# Patient Record
Sex: FEMALE | Race: WHITE | NOT HISPANIC OR LATINO | Employment: STUDENT | URBAN - METROPOLITAN AREA
[De-identification: names, ages, dates, MRNs, and addresses within clinical notes are randomized per-mention and may not be internally consistent; named-entity substitution may affect disease eponyms.]

---

## 2023-06-22 PROBLEM — H10.30 ACUTE CONJUNCTIVITIS: Status: ACTIVE | Noted: 2023-06-22

## 2023-06-22 PROBLEM — Q82.5 CONGENITAL NEVUS: Status: ACTIVE | Noted: 2023-06-22

## 2023-06-22 PROBLEM — H52.31 ANISOMETROPIA: Status: ACTIVE | Noted: 2023-06-22

## 2023-06-22 PROBLEM — M25.571 ANKLE PAIN, RIGHT: Status: ACTIVE | Noted: 2023-06-22

## 2023-06-22 PROBLEM — H10.012: Status: ACTIVE | Noted: 2023-06-22

## 2023-06-22 PROBLEM — G47.9 SLEEP DIFFICULTIES: Status: ACTIVE | Noted: 2023-06-22

## 2023-06-22 PROBLEM — H10.433 CHRONIC FOLLICULAR CONJUNCTIVITIS OF BOTH EYES: Status: ACTIVE | Noted: 2023-06-22

## 2023-06-22 PROBLEM — H04.123 DRY EYE SYNDROME OF BOTH EYES: Status: ACTIVE | Noted: 2023-06-22

## 2023-06-22 PROBLEM — E55.9 VITAMIN D DEFICIENCY: Status: ACTIVE | Noted: 2023-06-22

## 2023-06-22 PROBLEM — H53.021 ANISOMETROPIC AMBLYOPIA OF RIGHT EYE: Status: ACTIVE | Noted: 2023-06-22

## 2023-06-22 PROBLEM — M76.811 ANTERIOR TIBIALIS TENDINITIS OF RIGHT LOWER EXTREMITY: Status: ACTIVE | Noted: 2023-06-22

## 2023-06-22 PROBLEM — Z97.3 WEARS GLASSES: Status: ACTIVE | Noted: 2023-06-22

## 2023-06-22 PROBLEM — U07.1 COVID-19 VIRUS DETECTED: Status: ACTIVE | Noted: 2023-06-22

## 2023-06-22 PROBLEM — N94.6 DYSMENORRHEA: Status: ACTIVE | Noted: 2023-06-22

## 2023-06-22 PROBLEM — H52.01 HYPEROPIA OF RIGHT EYE WITH ASTIGMATISM: Status: ACTIVE | Noted: 2023-06-22

## 2023-06-22 PROBLEM — Z86.39 HISTORY OF EARLY MENARCHE: Status: ACTIVE | Noted: 2023-06-22

## 2023-06-22 PROBLEM — H53.001 AMBLYOPIA OF RIGHT EYE: Status: ACTIVE | Noted: 2023-06-22

## 2023-06-22 PROBLEM — J38.3 VOCAL CORD DYSFUNCTION: Status: ACTIVE | Noted: 2023-06-22

## 2023-06-22 PROBLEM — H10.13 ALLERGIC CONJUNCTIVITIS OF BOTH EYES: Status: ACTIVE | Noted: 2023-06-22

## 2023-06-22 PROBLEM — H10.89 GPC (GIANT PAPILLARY CONJUNCTIVITIS): Status: ACTIVE | Noted: 2023-06-22

## 2023-06-22 PROBLEM — H52.201 HYPEROPIA OF RIGHT EYE WITH ASTIGMATISM: Status: ACTIVE | Noted: 2023-06-22

## 2023-06-22 PROBLEM — N92.6 IRREGULAR PERIODS: Status: ACTIVE | Noted: 2023-06-22

## 2023-08-07 PROBLEM — J38.3 VOCAL CORD DYSFUNCTION: Status: RESOLVED | Noted: 2023-06-22 | Resolved: 2023-08-07

## 2023-08-07 PROBLEM — M25.571 ANKLE PAIN, RIGHT: Status: RESOLVED | Noted: 2023-06-22 | Resolved: 2023-08-07

## 2023-08-07 PROBLEM — H10.30 ACUTE CONJUNCTIVITIS: Status: RESOLVED | Noted: 2023-06-22 | Resolved: 2023-08-07

## 2023-08-07 PROBLEM — U07.1 COVID-19 VIRUS DETECTED: Status: RESOLVED | Noted: 2023-06-22 | Resolved: 2023-08-07

## 2023-08-07 PROBLEM — M76.811 ANTERIOR TIBIALIS TENDINITIS OF RIGHT LOWER EXTREMITY: Status: RESOLVED | Noted: 2023-06-22 | Resolved: 2023-08-07

## 2023-08-07 RX ORDER — EPINASTINE HYDROCHLORIDE 0.5 MG/ML
1 SOLUTION/ DROPS OPHTHALMIC 2 TIMES DAILY
COMMUNITY
Start: 2020-01-03 | End: 2023-08-08 | Stop reason: WASHOUT

## 2023-08-07 RX ORDER — NORGESTIMATE AND ETHINYL ESTRADIOL 0.25-0.035
1 KIT ORAL DAILY
COMMUNITY
Start: 2021-12-13 | End: 2023-08-08 | Stop reason: SDUPTHER

## 2023-08-08 ENCOUNTER — LAB (OUTPATIENT)
Dept: LAB | Facility: LAB | Age: 18
End: 2023-08-08
Payer: COMMERCIAL

## 2023-08-08 ENCOUNTER — OFFICE VISIT (OUTPATIENT)
Dept: PEDIATRICS | Facility: CLINIC | Age: 18
End: 2023-08-08
Payer: COMMERCIAL

## 2023-08-08 VITALS
WEIGHT: 115.44 LBS | HEIGHT: 63 IN | DIASTOLIC BLOOD PRESSURE: 70 MMHG | HEART RATE: 50 BPM | BODY MASS INDEX: 20.45 KG/M2 | SYSTOLIC BLOOD PRESSURE: 104 MMHG

## 2023-08-08 DIAGNOSIS — N89.8 HYMENAL REMNANT: ICD-10-CM

## 2023-08-08 DIAGNOSIS — R19.8 ALTERNATING CONSTIPATION AND DIARRHEA: ICD-10-CM

## 2023-08-08 DIAGNOSIS — Z00.01 ENCOUNTER FOR GENERAL ADULT MEDICAL EXAMINATION WITH ABNORMAL FINDINGS: Primary | ICD-10-CM

## 2023-08-08 PROBLEM — N92.6 IRREGULAR PERIODS: Status: RESOLVED | Noted: 2023-06-22 | Resolved: 2023-08-08

## 2023-08-08 PROBLEM — N94.6 DYSMENORRHEA: Status: RESOLVED | Noted: 2023-06-22 | Resolved: 2023-08-08

## 2023-08-08 LAB
ALANINE AMINOTRANSFERASE (SGPT) (U/L) IN SER/PLAS: 10 U/L (ref 7–45)
ALBUMIN (G/DL) IN SER/PLAS: 4.2 G/DL (ref 3.4–5)
ALKALINE PHOSPHATASE (U/L) IN SER/PLAS: 54 U/L (ref 33–110)
ANION GAP IN SER/PLAS: 11 MMOL/L (ref 10–20)
ASPARTATE AMINOTRANSFERASE (SGOT) (U/L) IN SER/PLAS: 13 U/L (ref 9–39)
BASOPHILS (10*3/UL) IN BLOOD BY AUTOMATED COUNT: 0.04 X10E9/L (ref 0–0.1)
BASOPHILS/100 LEUKOCYTES IN BLOOD BY AUTOMATED COUNT: 0.6 % (ref 0–2)
BILIRUBIN TOTAL (MG/DL) IN SER/PLAS: 0.8 MG/DL (ref 0–1.2)
C REACTIVE PROTEIN (MG/L) IN SER/PLAS: 0.35 MG/DL
CALCIUM (MG/DL) IN SER/PLAS: 9.6 MG/DL (ref 8.6–10.6)
CARBON DIOXIDE, TOTAL (MMOL/L) IN SER/PLAS: 26 MMOL/L (ref 21–32)
CHLORIDE (MMOL/L) IN SER/PLAS: 104 MMOL/L (ref 98–107)
CREATININE (MG/DL) IN SER/PLAS: 0.6 MG/DL (ref 0.5–1.05)
EOSINOPHILS (10*3/UL) IN BLOOD BY AUTOMATED COUNT: 0.21 X10E9/L (ref 0–0.7)
EOSINOPHILS/100 LEUKOCYTES IN BLOOD BY AUTOMATED COUNT: 3.2 % (ref 0–6)
ERYTHROCYTE DISTRIBUTION WIDTH (RATIO) BY AUTOMATED COUNT: 11.8 % (ref 11.5–14.5)
ERYTHROCYTE MEAN CORPUSCULAR HEMOGLOBIN CONCENTRATION (G/DL) BY AUTOMATED: 33.7 G/DL (ref 32–36)
ERYTHROCYTE MEAN CORPUSCULAR VOLUME (FL) BY AUTOMATED COUNT: 87 FL (ref 80–100)
ERYTHROCYTES (10*6/UL) IN BLOOD BY AUTOMATED COUNT: 4.73 X10E12/L (ref 4–5.2)
GFR FEMALE: >90 ML/MIN/1.73M2
GLUCOSE (MG/DL) IN SER/PLAS: 92 MG/DL (ref 74–99)
HEMATOCRIT (%) IN BLOOD BY AUTOMATED COUNT: 41.2 % (ref 36–46)
HEMOGLOBIN (G/DL) IN BLOOD: 13.9 G/DL (ref 12–16)
IMMATURE GRANULOCYTES/100 LEUKOCYTES IN BLOOD BY AUTOMATED COUNT: 0.3 % (ref 0–0.9)
LEUKOCYTES (10*3/UL) IN BLOOD BY AUTOMATED COUNT: 6.5 X10E9/L (ref 4.4–11.3)
LYMPHOCYTES (10*3/UL) IN BLOOD BY AUTOMATED COUNT: 2.31 X10E9/L (ref 1.2–4.8)
LYMPHOCYTES/100 LEUKOCYTES IN BLOOD BY AUTOMATED COUNT: 35.3 % (ref 13–44)
MONOCYTES (10*3/UL) IN BLOOD BY AUTOMATED COUNT: 0.64 X10E9/L (ref 0.1–1)
MONOCYTES/100 LEUKOCYTES IN BLOOD BY AUTOMATED COUNT: 9.8 % (ref 2–10)
NEUTROPHILS (10*3/UL) IN BLOOD BY AUTOMATED COUNT: 3.32 X10E9/L (ref 1.2–7.7)
NEUTROPHILS/100 LEUKOCYTES IN BLOOD BY AUTOMATED COUNT: 50.8 % (ref 40–80)
NRBC (PER 100 WBCS) BY AUTOMATED COUNT: 0 /100 WBC (ref 0–0)
PLATELETS (10*3/UL) IN BLOOD AUTOMATED COUNT: 306 X10E9/L (ref 150–450)
POTASSIUM (MMOL/L) IN SER/PLAS: 4.3 MMOL/L (ref 3.5–5.3)
PROTEIN TOTAL: 7.1 G/DL (ref 6.4–8.2)
SEDIMENTATION RATE, ERYTHROCYTE: 4 MM/H (ref 0–20)
SODIUM (MMOL/L) IN SER/PLAS: 137 MMOL/L (ref 136–145)
UREA NITROGEN (MG/DL) IN SER/PLAS: 10 MG/DL (ref 6–23)

## 2023-08-08 PROCEDURE — 83516 IMMUNOASSAY NONANTIBODY: CPT

## 2023-08-08 PROCEDURE — 36415 COLL VENOUS BLD VENIPUNCTURE: CPT

## 2023-08-08 PROCEDURE — 82784 ASSAY IGA/IGD/IGG/IGM EACH: CPT

## 2023-08-08 PROCEDURE — 86140 C-REACTIVE PROTEIN: CPT

## 2023-08-08 PROCEDURE — 99395 PREV VISIT EST AGE 18-39: CPT | Performed by: PEDIATRICS

## 2023-08-08 PROCEDURE — 96127 BRIEF EMOTIONAL/BEHAV ASSMT: CPT | Performed by: PEDIATRICS

## 2023-08-08 PROCEDURE — 85025 COMPLETE CBC W/AUTO DIFF WBC: CPT

## 2023-08-08 PROCEDURE — 1036F TOBACCO NON-USER: CPT | Performed by: PEDIATRICS

## 2023-08-08 PROCEDURE — 80053 COMPREHEN METABOLIC PANEL: CPT

## 2023-08-08 PROCEDURE — 85652 RBC SED RATE AUTOMATED: CPT

## 2023-08-08 RX ORDER — TALC
POWDER (GRAM) TOPICAL
COMMUNITY
Start: 2022-05-03

## 2023-08-08 RX ORDER — NORGESTIMATE AND ETHINYL ESTRADIOL 0.25-0.035
1 KIT ORAL DAILY
Qty: 84 TABLET | Refills: 4 | Status: SHIPPED | OUTPATIENT
Start: 2023-08-20

## 2023-08-08 RX ORDER — UBIDECARENONE 30 MG
CAPSULE ORAL
COMMUNITY

## 2023-08-08 NOTE — PROGRESS NOTES
"Subjective     Tina is here her annual well visit.    Questions or concerns:I  Intermittent constipation with alternating diarrhea without much pain but does have particularly malodorous flatus  Vaginal opening appears to have a band across it which makes it difficult to remove a tampon.  She does not have difficulty inserting one or have pain with sex.    Nutrition, Elimination, and Sleep:  Nutrition:  well-balanced diet  Elimination:  normal frequency and quality of stool  Sleep:  adequate, no snoring identified    Currently menstruating? yes; current menstrual pattern: regular every month without intermenstrual spotting    Social:  Peer relations:  no concerns  Family relations:  no concerns  School performance:  no concerns  Teen questionnaire:  reviewed  Activities:  working, college; had an internship in [place]    Objective   /70   Pulse 50   Ht 1.594 m (5' 2.75\")   Wt 52.4 kg (115 lb 7 oz)   BMI 20.61 kg/m²   Physical Exam  Vitals reviewed.   Constitutional:       General: She is not in acute distress.     Appearance: Normal appearance. She is not ill-appearing.   HENT:      Head: Normocephalic and atraumatic.      Right Ear: Tympanic membrane, ear canal and external ear normal.      Left Ear: Tympanic membrane, ear canal and external ear normal.      Nose: Nose normal.      Mouth/Throat:      Mouth: Mucous membranes are moist.      Pharynx: Oropharynx is clear.   Eyes:      Extraocular Movements: Extraocular movements intact.      Conjunctiva/sclera: Conjunctivae normal.      Pupils: Pupils are equal, round, and reactive to light.   Neck:      Thyroid: No thyroid mass or thyromegaly.   Cardiovascular:      Rate and Rhythm: Normal rate and regular rhythm.      Pulses: Normal pulses.      Heart sounds: Normal heart sounds. No murmur heard.     No gallop.   Pulmonary:      Effort: Pulmonary effort is normal. No respiratory distress.      Breath sounds: Normal breath sounds.   Chest:   Breasts:     " Javier Score is 5.   Abdominal:      General: There is no distension.      Palpations: Abdomen is soft. There is no hepatomegaly, splenomegaly or mass.      Tenderness: There is no abdominal tenderness.      Hernia: No hernia is present.   Genitourinary:     Javier stage (genital): 5.          Comments: Band of tissue as noted above in vagina  Musculoskeletal:         General: No swelling, deformity or signs of injury. Normal range of motion.      Cervical back: Normal range of motion and neck supple.      Thoracic back: No scoliosis.   Lymphadenopathy:      Comments: no significant lymphadenopathy > 1 cm   Skin:     General: Skin is warm and dry.   Neurological:      General: No focal deficit present.      Motor: No weakness.   Psychiatric:         Mood and Affect: Mood normal.         Thought Content: Thought content normal.          Assessment/Plan   1. Encounter for general adult medical examination with abnormal findings  norgestimate-ethinyl estradioL (Sprintec, 28,) 0.25-35 mg-mcg tablet      2. Hymenal remnant  Referral to Obstetrics / Gynecology      3. Alternating constipation and diarrhea  CBC and Auto Differential    Comprehensive Metabolic Panel    C-Reactive Protein    IgA    Sedimentation Rate    Tissue Transglutaminase IgA         Tina is a healthy and thriving adult.  - She has a hymenal remnant which seems to be catching engorged tampons.  I have referred her to GYN for this as well as consideration of IUD insertion and for routine care,  - If GI screening labs normal, consider 2 week trial lactose free.  - Guidance regarding safety, nutrition, physical activity, and sleep reviewed.  - Social:  teenage questionnaire completed and reviewed.  Issues of smoking, vaping, substance use, sexuality, and mood discussed.    - Vaccines:  encouraged COVID-19 booster with flu vaccine this fall  - Return in 1 year for annual well exam or sooner if concerns arise

## 2023-08-08 NOTE — PATIENT INSTRUCTIONS
GYNECOLOGISTS:     [phone]  Dr. Kenna Marrero - [facility]  Dr. Brittany Galdamez [facility]  [facility]  [facility]  Dr. Gillian Ellison [facility]  Dr. Love Sousa - [place]    [phone]  [facility]    [phone]  Dr. Stephany Osuna [facility]    [phone]  Dr. Violeta Wolf [facility]

## 2023-08-09 LAB
IGA (MG/DL) IN SER/PLAS: 194 MG/DL (ref 70–400)
TISSUE TRANSGLUTAMINASE, IGA: <1 U/ML (ref 0–14)

## 2023-11-22 ENCOUNTER — PREP FOR PROCEDURE (OUTPATIENT)
Dept: OBSTETRICS AND GYNECOLOGY | Facility: CLINIC | Age: 18
End: 2023-11-22

## 2023-11-22 ENCOUNTER — OFFICE VISIT (OUTPATIENT)
Dept: OBSTETRICS AND GYNECOLOGY | Facility: CLINIC | Age: 18
End: 2023-11-22
Payer: COMMERCIAL

## 2023-11-22 VITALS
SYSTOLIC BLOOD PRESSURE: 118 MMHG | BODY MASS INDEX: 22.26 KG/M2 | HEIGHT: 62 IN | DIASTOLIC BLOOD PRESSURE: 78 MMHG | WEIGHT: 121 LBS

## 2023-11-22 DIAGNOSIS — N94.10 DYSPAREUNIA, FEMALE: Primary | ICD-10-CM

## 2023-11-22 DIAGNOSIS — Z30.430 ENCOUNTER FOR IUD INSERTION: ICD-10-CM

## 2023-11-22 PROCEDURE — 1036F TOBACCO NON-USER: CPT | Performed by: OBSTETRICS & GYNECOLOGY

## 2023-11-22 PROCEDURE — 99214 OFFICE O/P EST MOD 30 MIN: CPT | Performed by: OBSTETRICS & GYNECOLOGY

## 2023-11-22 RX ORDER — GABAPENTIN 600 MG/1
600 TABLET ORAL ONCE
Status: CANCELLED | OUTPATIENT
Start: 2023-11-22 | End: 2023-11-22

## 2023-11-22 RX ORDER — CELECOXIB 50 MG/1
400 CAPSULE ORAL ONCE
Status: CANCELLED | OUTPATIENT
Start: 2023-11-22 | End: 2023-11-22

## 2023-11-22 RX ORDER — ACETAMINOPHEN 325 MG/1
975 TABLET ORAL ONCE
Status: CANCELLED | OUTPATIENT
Start: 2023-11-22 | End: 2023-11-22

## 2023-11-22 NOTE — LETTER
November 22, 2023     Saundra Newman MD  [address]  [address]  [facility] [address]    Patient: Tina Neal   YOB: 2005   Date of Visit: 11/22/2023       Dear Dr. Saundra Newman MD:    Thank you for referring Tina Neal to me for evaluation. Below are my notes for this consultation.  If you have questions, please do not hesitate to call me. I look forward to following your patient along with you.       Sincerely,     Jaskaran Sanchez, DO      CC: No Recipients  ______________________________________________________________________________________    Tina Neal is a 18 y.o. female who presents with a chief complaint of Follow-up (Patient complains she has a problem with tampons getting stuck, PCP referred )      SUBJECTIVE  Patient presents as a consult from Dr Kristel Newman MD complaining of her hymen obstructing her being able to take tampons in and out.  This has been going on for the last 3 years.  She does not really have a lot of pain with intercourse from it but the prompted her tampon his been more difficult.  She would like to have a surgical correction.  She would also like to have an IUD placed sometime    Past Medical History:   Diagnosis Date   • COVID-19 virus detected 06/22/2023   • Dysmenorrhea 06/22/2023   • Irregular periods 06/22/2023   • Personal history of other diseases of the respiratory system 12/08/2014    History of asthma   • Personal history of other diseases of the respiratory system 12/08/2014    History of chronic sinusitis   • Vocal cord dysfunction 06/22/2023     Past Surgical History:   Procedure Laterality Date   • OTHER SURGICAL HISTORY  04/17/2021    No history of surgery     Social History     Socioeconomic History   • Marital status: Single     Spouse name: None   • Number of children: None   • Years of education: None   • Highest education level: None   Occupational History   • None   Tobacco Use   • Smoking status: Never   • Smokeless tobacco: Never  Patient drove himself to the ER but states he will find a ride home if given stronger pain medications.   "  Vaping Use   • Vaping Use: Some days   • Substances: Nicotine   • Devices: Disposable   Substance and Sexual Activity   • Alcohol use: Yes     Comment: social   • Drug use: Never   • Sexual activity: Yes     Partners: Male     Birth control/protection: OCP   Other Topics Concern   • None   Social History Narrative   • None     Social Determinants of Health     Financial Resource Strain: Not on file   Food Insecurity: Not on file   Transportation Needs: Not on file   Physical Activity: Not on file   Stress: Not on file   Social Connections: Not on file   Intimate Partner Violence: Not on file   Housing Stability: Not on file     No family history on file.    OB History    Para Term  AB Living   0 0 0 0 0 0   SAB IAB Ectopic Multiple Live Births   0 0 0 0 0       OBJECTIVE  No Known Allergies   (Not in a hospital admission)       Review of Systems  History obtained from the patient  General ROS: negative  Psychological ROS: negative  Gastrointestinal ROS: no abdominal pain, change in bowel habits, or black or bloody stools  Musculoskeletal ROS: negative  Physical Exam  General Appearance: awake, alert, oriented, in no acute distress, well developed, well nourished, and in no acute distress  Skin: there are no suspicious lesions or rashes of concern, skin color, texture, turgor are normal; there are no bruises, rashes or lesions.  Head/Face: NCAT  Eyes: No gross abnormalities., PERRL, and EOMI  Abdomen: Soft, non-tender, normal bowel sounds; no bruits, organomegaly or masses.  Extremities: Extremities warm to touch, pink, with no edema.  Musculoskeletal: negative  Urogen: External genitalia: Normal and Vagina: hymen is intact and proniunced posteriorly    /78   Ht 1.575 m (5' 2\")   Wt 54.9 kg (121 lb)   LMP  (LMP Unknown) Comment: 3 wks ago  BMI 22.13 kg/m²    Problem List Items Addressed This Visit    None  Visit Diagnoses       Dyspareunia, female    -  Primary         Set up for surgical " repair and iud removal  Given iud literature

## 2023-11-22 NOTE — PROGRESS NOTES
Tina Neal is a 18 y.o. female who presents with a chief complaint of Follow-up (Patient complains she has a problem with tampons getting stuck, PCP referred )      SUBJECTIVE  Patient presents as a consult from Dr Kristel Newman MD complaining of her hymen obstructing her being able to take tampons in and out.  This has been going on for the last 3 years.  She does not really have a lot of pain with intercourse from it but the prompted her tampon his been more difficult.  She would like to have a surgical correction.  She would also like to have an IUD placed sometime    Past Medical History:   Diagnosis Date    COVID-19 virus detected 06/22/2023    Dysmenorrhea 06/22/2023    Irregular periods 06/22/2023    Personal history of other diseases of the respiratory system 12/08/2014    History of asthma    Personal history of other diseases of the respiratory system 12/08/2014    History of chronic sinusitis    Vocal cord dysfunction 06/22/2023     Past Surgical History:   Procedure Laterality Date    OTHER SURGICAL HISTORY  04/17/2021    No history of surgery     Social History     Socioeconomic History    Marital status: Single     Spouse name: None    Number of children: None    Years of education: None    Highest education level: None   Occupational History    None   Tobacco Use    Smoking status: Never    Smokeless tobacco: Never   Vaping Use    Vaping Use: Some days    Substances: Nicotine    Devices: Disposable   Substance and Sexual Activity    Alcohol use: Yes     Comment: social    Drug use: Never    Sexual activity: Yes     Partners: Male     Birth control/protection: OCP   Other Topics Concern    None   Social History Narrative    None     Social Determinants of Health     Financial Resource Strain: Not on file   Food Insecurity: Not on file   Transportation Needs: Not on file   Physical Activity: Not on file   Stress: Not on file   Social Connections: Not on file   Intimate Partner Violence: Not on file  "  Housing Stability: Not on file     No family history on file.    OB History    Para Term  AB Living   0 0 0 0 0 0   SAB IAB Ectopic Multiple Live Births   0 0 0 0 0       OBJECTIVE  No Known Allergies   (Not in a hospital admission)       Review of Systems  History obtained from the patient  General ROS: negative  Psychological ROS: negative  Gastrointestinal ROS: no abdominal pain, change in bowel habits, or black or bloody stools  Musculoskeletal ROS: negative  Physical Exam  General Appearance: awake, alert, oriented, in no acute distress, well developed, well nourished, and in no acute distress  Skin: there are no suspicious lesions or rashes of concern, skin color, texture, turgor are normal; there are no bruises, rashes or lesions.  Head/Face: NCAT  Eyes: No gross abnormalities., PERRL, and EOMI  Abdomen: Soft, non-tender, normal bowel sounds; no bruits, organomegaly or masses.  Extremities: Extremities warm to touch, pink, with no edema.  Musculoskeletal: negative  Urogen: External genitalia: Normal and Vagina: hymen is intact and proniunced posteriorly    /78   Ht 1.575 m (5' 2\")   Wt 54.9 kg (121 lb)   LMP  (LMP Unknown) Comment: 3 wks ago  BMI 22.13 kg/m²    Problem List Items Addressed This Visit    None  Visit Diagnoses       Dyspareunia, female    -  Primary         Set up for surgical repair and iud removal  Given iud literature      "

## 2023-11-27 ENCOUNTER — HOSPITAL ENCOUNTER (OUTPATIENT)
Facility: HOSPITAL | Age: 18
Setting detail: OUTPATIENT SURGERY
End: 2023-11-27
Attending: OBSTETRICS & GYNECOLOGY | Admitting: OBSTETRICS & GYNECOLOGY
Payer: COMMERCIAL

## 2023-11-27 PROBLEM — N94.10 DYSPAREUNIA, FEMALE: Status: ACTIVE | Noted: 2023-11-22

## 2023-11-27 PROBLEM — Z30.430 ENCOUNTER FOR IUD INSERTION: Status: ACTIVE | Noted: 2023-11-22

## 2023-12-05 ENCOUNTER — APPOINTMENT (OUTPATIENT)
Dept: OBSTETRICS AND GYNECOLOGY | Facility: CLINIC | Age: 18
End: 2023-12-05
Payer: COMMERCIAL

## 2024-05-06 ENCOUNTER — OFFICE VISIT (OUTPATIENT)
Dept: PEDIATRICS | Facility: CLINIC | Age: 19
End: 2024-05-06
Payer: COMMERCIAL

## 2024-05-06 ENCOUNTER — HOSPITAL ENCOUNTER (OUTPATIENT)
Dept: RADIOLOGY | Facility: CLINIC | Age: 19
Discharge: HOME | End: 2024-05-06
Payer: COMMERCIAL

## 2024-05-06 VITALS — BODY MASS INDEX: 22.26 KG/M2 | TEMPERATURE: 98.7 F | WEIGHT: 121.7 LBS

## 2024-05-06 DIAGNOSIS — R07.1 CHEST PAIN ON BREATHING: ICD-10-CM

## 2024-05-06 DIAGNOSIS — R07.1 CHEST PAIN ON BREATHING: Primary | ICD-10-CM

## 2024-05-06 PROCEDURE — 71101 X-RAY EXAM UNILAT RIBS/CHEST: CPT | Mod: LT

## 2024-05-06 PROCEDURE — 71101 X-RAY EXAM UNILAT RIBS/CHEST: CPT | Mod: LEFT SIDE | Performed by: RADIOLOGY

## 2024-05-06 PROCEDURE — 99213 OFFICE O/P EST LOW 20 MIN: CPT | Performed by: PEDIATRICS

## 2024-05-06 PROCEDURE — 1036F TOBACCO NON-USER: CPT | Performed by: PEDIATRICS

## 2024-05-06 NOTE — PROGRESS NOTES
"Subjective   Patient ID: Tina Neal is a 19 y.o. female who is here for concern of Injury (Sports/).    HPI  While playing volleyball 1 week ago, she was essentially \"tackled.\" While setting the ball, someone ran into her left side.  She got the \"wind knocked out\" of her and felt some pain on the left side of her chest.  She spent much of last week moving her things out of her old apartment, then felt worse 3 days ago.  In addition, she has had a lingering cold for a week.  It causes to her intermittently cough.  Her chest hurts more when she coughs as well as when she takes a deep breath and move certain ways.    Objective   Temperature 37.1 °C (98.7 °F), temperature source Temporal, weight 55.2 kg (121 lb 11.2 oz).  Physical Exam  Constitutional:       General: She is not in acute distress.     Appearance: She is not ill-appearing.   HENT:      Right Ear: Tympanic membrane and ear canal normal.      Left Ear: Tympanic membrane and ear canal normal.      Nose: Congestion (mild) present.      Mouth/Throat:      Mouth: Mucous membranes are moist.      Pharynx: No oropharyngeal exudate or posterior oropharyngeal erythema.   Eyes:      Conjunctiva/sclera: Conjunctivae normal.   Cardiovascular:      Rate and Rhythm: Normal rate and regular rhythm.   Pulmonary:      Effort: Pulmonary effort is normal.      Breath sounds: Normal breath sounds.   Chest:      Chest wall: Tenderness (generalized along L side, below axilla; no point tenderness) present.   Musculoskeletal:      Cervical back: Neck supple.   Lymphadenopathy:      Cervical: No cervical adenopathy.     I reviewed the Xray myself; no bony changes or infiltrate noted.     Assessment/Plan   Problem List Items Addressed This Visit    None  Visit Diagnoses       Chest pain on breathing    -  Primary        Tina's injury, course, current symptoms, and xray is consistent with a chest wall bruising rather than fracture.  Symptomatic treatment discussed.  Follow-up " if not starting to improve in 1 week or sooner if worsens

## 2024-05-07 ENCOUNTER — APPOINTMENT (OUTPATIENT)
Dept: OBSTETRICS AND GYNECOLOGY | Facility: CLINIC | Age: 19
End: 2024-05-07
Payer: COMMERCIAL

## 2024-05-09 ENCOUNTER — OFFICE VISIT (OUTPATIENT)
Dept: PEDIATRICS | Facility: CLINIC | Age: 19
End: 2024-05-09
Payer: COMMERCIAL

## 2024-05-09 VITALS — WEIGHT: 124 LBS | BODY MASS INDEX: 22.68 KG/M2 | TEMPERATURE: 98.4 F

## 2024-05-09 DIAGNOSIS — J01.90 ACUTE NON-RECURRENT SINUSITIS, UNSPECIFIED LOCATION: ICD-10-CM

## 2024-05-09 DIAGNOSIS — H66.002 NON-RECURRENT ACUTE SUPPURATIVE OTITIS MEDIA OF LEFT EAR WITHOUT SPONTANEOUS RUPTURE OF TYMPANIC MEMBRANE: Primary | ICD-10-CM

## 2024-05-09 PROCEDURE — 1036F TOBACCO NON-USER: CPT | Performed by: PEDIATRICS

## 2024-05-09 PROCEDURE — 99214 OFFICE O/P EST MOD 30 MIN: CPT | Performed by: PEDIATRICS

## 2024-05-09 RX ORDER — AMOXICILLIN 875 MG/1
875 TABLET, FILM COATED ORAL 2 TIMES DAILY
Qty: 20 TABLET | Refills: 0 | Status: SHIPPED | OUTPATIENT
Start: 2024-05-09 | End: 2024-05-19

## 2024-05-09 RX ORDER — NITROFURANTOIN 25; 75 MG/1; MG/1
CAPSULE ORAL
COMMUNITY
Start: 2023-01-20

## 2024-05-09 NOTE — PROGRESS NOTES
Subjective     History was provided by   Tina .    Tina is here with the following concern:    Tina is just home from [facility] and presents with head congestion and L ear pain without fever for several days, no sore throat.    Objective     Temp 36.9 °C (98.4 °F)   Wt 56.2 kg (124 lb)   BMI 22.68 kg/m²       General:  well-appearing, well hydrated and in no acute distress  Audible nasal congestion   Eyes:  Lids:  normal  Conjunctivae:  normal     ENT:  Ears:  RTM: normal yes           LTM:  normal no - Injected Tm with purulent effusion  Nose:  nares clear  Mouth:  mucosa moist; no visible lesions  Throat:  OP clear yes and moist; uvula midline  Neck:  supple     Respiratory:  Respiratory rate:  normal  Air exchange:  normal   Adventitious breath sounds:  none  Accessory muscle use:  none     Heart:  Regular rate and rhythm, no murmur     GI: Normal bowel sounds, soft, non-tender, no HSM     Skin:  Warm and well-perfused and no rashes apparent     Lymphatic: No nodes larger than 1 cm palpated  No firm or fixed nodes palpated       Assessment/Plan     Tina Neal is well-appearing, well-hydrated, in no acute distress, and afebrile at today's visit.    Her clinical presentation and examination indicates the diagnosis of L otitis media and likely sinusitis    Her treatment plan includes fluids, rest, Amox as prescribed    Supportive care measures and expected course of illness reviewed.    Follow up promptly for worsening or prolonged illness.    Kumar Wilson MD MPH

## 2024-06-12 DIAGNOSIS — Z00.01 ENCOUNTER FOR GENERAL ADULT MEDICAL EXAMINATION WITH ABNORMAL FINDINGS: ICD-10-CM

## 2024-06-12 RX ORDER — NORGESTIMATE AND ETHINYL ESTRADIOL 0.25-0.035
1 KIT ORAL DAILY
Qty: 84 TABLET | Refills: 3 | Status: SHIPPED | OUTPATIENT
Start: 2024-06-12

## 2024-07-11 ENCOUNTER — TELEPHONE (OUTPATIENT)
Dept: OBSTETRICS AND GYNECOLOGY | Facility: CLINIC | Age: 19
End: 2024-07-11
Payer: COMMERCIAL

## 2024-07-17 ENCOUNTER — PREP FOR PROCEDURE (OUTPATIENT)
Dept: OBSTETRICS AND GYNECOLOGY | Facility: CLINIC | Age: 19
End: 2024-07-17
Payer: COMMERCIAL

## 2024-07-17 DIAGNOSIS — N94.10 DYSPAREUNIA IN FEMALE: Primary | ICD-10-CM

## 2024-07-17 RX ORDER — ACETAMINOPHEN 325 MG/1
975 TABLET ORAL ONCE
OUTPATIENT
Start: 2024-07-17 | End: 2024-07-17

## 2024-07-17 RX ORDER — GABAPENTIN 600 MG/1
600 TABLET ORAL ONCE
OUTPATIENT
Start: 2024-07-17 | End: 2024-07-17

## 2024-07-17 RX ORDER — CELECOXIB 400 MG/1
400 CAPSULE ORAL ONCE
OUTPATIENT
Start: 2024-07-17 | End: 2024-07-17

## 2024-07-24 ENCOUNTER — HOSPITAL ENCOUNTER (OUTPATIENT)
Facility: HOSPITAL | Age: 19
Setting detail: OUTPATIENT SURGERY
End: 2024-07-24
Attending: OBSTETRICS & GYNECOLOGY | Admitting: OBSTETRICS & GYNECOLOGY
Payer: COMMERCIAL

## 2024-07-24 ENCOUNTER — TELEPHONE (OUTPATIENT)
Dept: PEDIATRICS | Facility: CLINIC | Age: 19
End: 2024-07-24
Payer: COMMERCIAL

## 2024-07-24 PROBLEM — N94.10 DYSPAREUNIA IN FEMALE: Status: ACTIVE | Noted: 2024-07-17

## 2024-07-24 NOTE — TELEPHONE ENCOUNTER
Mom calling- first cavity about a year ago and then this year she had 5, they switched insurance before they could have it filled and she switched dentists and a month later she had 9 cavities.  Mom wondering if you think there could be something medically wrong and if she should get some sort of blood work done?  Please advise.     [phone] Mom   [phone] Tina

## 2024-07-24 NOTE — TELEPHONE ENCOUNTER
I called Tina and left a message on her identified voicemail.  The only things I can think of are celiac disease, though I screened her for that last year and it was negative.  GERD could increase acidity in her mouth, so if she is having any symptoms like that (described), please let me know.  I would also check with her new dentist to see if he or she has any thoughts.

## 2024-11-11 ENCOUNTER — PREP FOR PROCEDURE (OUTPATIENT)
Dept: OBSTETRICS AND GYNECOLOGY | Facility: CLINIC | Age: 19
End: 2024-11-11

## 2024-11-11 ENCOUNTER — APPOINTMENT (OUTPATIENT)
Dept: OBSTETRICS AND GYNECOLOGY | Facility: CLINIC | Age: 19
End: 2024-11-11
Payer: COMMERCIAL

## 2024-11-11 VITALS
WEIGHT: 121 LBS | SYSTOLIC BLOOD PRESSURE: 92 MMHG | HEIGHT: 62 IN | DIASTOLIC BLOOD PRESSURE: 64 MMHG | BODY MASS INDEX: 22.26 KG/M2

## 2024-11-11 DIAGNOSIS — Z00.01 ENCOUNTER FOR GENERAL ADULT MEDICAL EXAMINATION WITH ABNORMAL FINDINGS: ICD-10-CM

## 2024-11-11 DIAGNOSIS — Q52.4 SEPTATE HYMEN: Primary | ICD-10-CM

## 2024-11-11 DIAGNOSIS — Z01.411 ENCOUNTER FOR GYNECOLOGICAL EXAMINATION WITH ABNORMAL FINDING: Primary | ICD-10-CM

## 2024-11-11 DIAGNOSIS — Q52.4 SEPTATE HYMEN: ICD-10-CM

## 2024-11-11 PROCEDURE — 99395 PREV VISIT EST AGE 18-39: CPT | Performed by: OBSTETRICS & GYNECOLOGY

## 2024-11-11 PROCEDURE — 87591 N.GONORRHOEAE DNA AMP PROB: CPT

## 2024-11-11 PROCEDURE — 87491 CHLMYD TRACH DNA AMP PROBE: CPT

## 2024-11-11 PROCEDURE — 87661 TRICHOMONAS VAGINALIS AMPLIF: CPT

## 2024-11-11 PROCEDURE — 3008F BODY MASS INDEX DOCD: CPT | Performed by: OBSTETRICS & GYNECOLOGY

## 2024-11-11 PROCEDURE — 1036F TOBACCO NON-USER: CPT | Performed by: OBSTETRICS & GYNECOLOGY

## 2024-11-11 RX ORDER — NORGESTIMATE AND ETHINYL ESTRADIOL 0.25-0.035
1 KIT ORAL DAILY
Qty: 84 TABLET | Refills: 0 | Status: SHIPPED | OUTPATIENT
Start: 2024-11-11

## 2024-11-11 NOTE — PROGRESS NOTES
"Subjective   Tina Neal is a 19 y.o. female here for a routine exam.  Review of the chart notes she was last seen 2023 by Dr. Last for a problem with the hymen causing tampons to be difficult to remove.  She feels that this problem persists but she has been able to work with it more.  She feels there is a \"loop\" of tissue in the way.  There is no dysuria, no discharge, no change in bowel habits no pelvic pain.    Her cycles are regular on birth control pills.    She lives in [place], getting an MARY from [facility].    Personal health questionnaire reviewed: yes.     Gynecologic History  Patient's last menstrual period was 2024 (approximate).  Contraception: OCP (estrogen/progesterone)  Last Pap: n/a. Results were:  n/a    Obstetric History  OB History    Para Term  AB Living   0 0 0 0 0 0   SAB IAB Ectopic Multiple Live Births   0 0 0 0 0       Objective   Constitutional: Alert and in no acute distress. Well developed, well nourished.   Head and Face: Head and face: Normal.    Eyes: Normal external exam - nonicteric sclera, extraocular movements intact (EOMI) and no ptosis.   Neck: No neck asymmetry. Supple. Thyroid not enlarged and there were no palpable thyroid nodules.    Pulmonary: No respiratory distress.   Chest: Breasts: Normal appearance, no nipple discharge and no skin changes. Palpation of breasts and axillae: No palpable mass and no axillary lymphadenopathy.   Abdomen: Soft nontender; no abdominal mass palpated. No organomegaly. No hernias.   Genitourinary: External genitalia: On exam, she has a hymen septum.  Although it is attached at 12:00 to 6:00 location, there is asymmetry.  The opening to the patient's right is wider than the left.  No inguinal lymphadenopathy. Bartholin's Urethral and Skenes Glands: Normal. Urethra: Normal.  Bladder: Normal on palpation. Vagina: Normal. Cervix: Normal.  Uterus: Normal.  Right Adnexa/parametria: Normal.  Left " Adnexa/parametria: Normal.  Inspection of Perianal Area: Normal.   Musculoskeletal: No joint swelling seen, normal movements of all extremities.   Skin: Normal skin color and pigmentation, normal skin turgor, and no rash.   Neurologic: Non-focal. Grossly intact.   Psychiatric: Alert and oriented x 3. Affect normal to patient baseline. Mood: Appropriate.  Physical Exam     Assessment/Plan   Healthy female exam.  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  I was able to place speculum and obtained a Pap with cultures for chlamydia, gonorrhea and trichomonas.  The opening is larger to the patient's right of the hymen septum.  She desires excision of the septum, with insertion of progesterone-containing IUD in the OR.    The information was forwarded to the surgery scheduler.  She will be in town for procedures in December.  Contraception: OCP (estrogen/progesterone).

## 2024-11-12 ENCOUNTER — TELEPHONE (OUTPATIENT)
Dept: OBSTETRICS AND GYNECOLOGY | Facility: CLINIC | Age: 19
End: 2024-11-12
Payer: COMMERCIAL

## 2024-11-12 PROBLEM — Q52.4 SEPTATE HYMEN: Status: ACTIVE | Noted: 2024-11-11

## 2024-11-13 LAB
C TRACH RRNA SPEC QL NAA+PROBE: NEGATIVE
N GONORRHOEA DNA SPEC QL PROBE+SIG AMP: NEGATIVE
T VAGINALIS RRNA SPEC QL NAA+PROBE: NEGATIVE

## 2024-11-25 LAB
CYTOLOGY CMNT CVX/VAG CYTO-IMP: NORMAL
LAB AP CONTRACEPTIVE HISTORY: NORMAL
LAB AP HPV GENOTYPE QUESTION: YES
LAB AP HPV HR: NORMAL
LAB AP PAP ADDITIONAL TESTS: NORMAL
LABORATORY COMMENT REPORT: NORMAL
LMP START DATE: NORMAL
PATH REPORT.TOTAL CANCER: NORMAL

## 2024-11-26 ENCOUNTER — APPOINTMENT (OUTPATIENT)
Dept: PEDIATRICS | Facility: CLINIC | Age: 19
End: 2024-11-26
Payer: COMMERCIAL

## 2024-11-26 VITALS
SYSTOLIC BLOOD PRESSURE: 100 MMHG | WEIGHT: 119.5 LBS | DIASTOLIC BLOOD PRESSURE: 65 MMHG | HEIGHT: 63 IN | HEART RATE: 72 BPM | BODY MASS INDEX: 21.17 KG/M2

## 2024-11-26 DIAGNOSIS — Z00.00 WELLNESS EXAMINATION: Primary | ICD-10-CM

## 2024-11-26 DIAGNOSIS — Z23 ENCOUNTER FOR IMMUNIZATION: ICD-10-CM

## 2024-11-26 PROBLEM — G47.9 SLEEP DIFFICULTIES: Status: RESOLVED | Noted: 2023-06-22 | Resolved: 2024-11-26

## 2024-11-26 PROBLEM — R19.8 ALTERNATING CONSTIPATION AND DIARRHEA: Status: RESOLVED | Noted: 2023-08-08 | Resolved: 2024-11-26

## 2024-11-26 PROBLEM — N94.10 DYSPAREUNIA IN FEMALE: Status: ACTIVE | Noted: 2023-11-22

## 2024-11-26 PROCEDURE — 90471 IMMUNIZATION ADMIN: CPT | Performed by: PEDIATRICS

## 2024-11-26 PROCEDURE — 96127 BRIEF EMOTIONAL/BEHAV ASSMT: CPT | Performed by: PEDIATRICS

## 2024-11-26 PROCEDURE — 99395 PREV VISIT EST AGE 18-39: CPT | Performed by: PEDIATRICS

## 2024-11-26 PROCEDURE — 3008F BODY MASS INDEX DOCD: CPT | Performed by: PEDIATRICS

## 2024-11-26 PROCEDURE — 1036F TOBACCO NON-USER: CPT | Performed by: PEDIATRICS

## 2024-11-26 PROCEDURE — 90715 TDAP VACCINE 7 YRS/> IM: CPT | Performed by: PEDIATRICS

## 2024-11-26 ASSESSMENT — PATIENT HEALTH QUESTIONNAIRE - PHQ9
10. IF YOU CHECKED OFF ANY PROBLEMS, HOW DIFFICULT HAVE THESE PROBLEMS MADE IT FOR YOU TO DO YOUR WORK, TAKE CARE OF THINGS AT HOME, OR GET ALONG WITH OTHER PEOPLE: NOT DIFFICULT AT ALL
6. FEELING BAD ABOUT YOURSELF - OR THAT YOU ARE A FAILURE OR HAVE LET YOURSELF OR YOUR FAMILY DOWN: NOT AT ALL
SUM OF ALL RESPONSES TO PHQ QUESTIONS 1-9: 1
2. FEELING DOWN, DEPRESSED OR HOPELESS: NOT AT ALL
8. MOVING OR SPEAKING SO SLOWLY THAT OTHER PEOPLE COULD HAVE NOTICED. OR THE OPPOSITE, BEING SO FIGETY OR RESTLESS THAT YOU HAVE BEEN MOVING AROUND A LOT MORE THAN USUAL: NOT AT ALL
1. LITTLE INTEREST OR PLEASURE IN DOING THINGS: NOT AT ALL
3. TROUBLE FALLING OR STAYING ASLEEP: NOT AT ALL
9. THOUGHTS THAT YOU WOULD BE BETTER OFF DEAD, OR OF HURTING YOURSELF: NOT AT ALL
2. FEELING DOWN, DEPRESSED OR HOPELESS: NOT AT ALL
6. FEELING BAD ABOUT YOURSELF - OR THAT YOU ARE A FAILURE OR HAVE LET YOURSELF OR YOUR FAMILY DOWN: NOT AT ALL
SUM OF ALL RESPONSES TO PHQ9 QUESTIONS 1 & 2: 0
9. THOUGHTS THAT YOU WOULD BE BETTER OFF DEAD, OR OF HURTING YOURSELF: NOT AT ALL
8. MOVING OR SPEAKING SO SLOWLY THAT OTHER PEOPLE COULD HAVE NOTICED. OR THE OPPOSITE - BEING SO FIDGETY OR RESTLESS THAT YOU HAVE BEEN MOVING AROUND A LOT MORE THAN USUAL: NOT AT ALL
4. FEELING TIRED OR HAVING LITTLE ENERGY: SEVERAL DAYS
4. FEELING TIRED OR HAVING LITTLE ENERGY: SEVERAL DAYS
3. TROUBLE FALLING OR STAYING ASLEEP OR SLEEPING TOO MUCH: NOT AT ALL
5. POOR APPETITE OR OVEREATING: NOT AT ALL
1. LITTLE INTEREST OR PLEASURE IN DOING THINGS: NOT AT ALL
10. IF YOU CHECKED OFF ANY PROBLEMS, HOW DIFFICULT HAVE THESE PROBLEMS MADE IT FOR YOU TO DO YOUR WORK, TAKE CARE OF THINGS AT HOME, OR GET ALONG WITH OTHER PEOPLE: NOT DIFFICULT AT ALL
7. TROUBLE CONCENTRATING ON THINGS, SUCH AS READING THE NEWSPAPER OR WATCHING TELEVISION: NOT AT ALL
5. POOR APPETITE OR OVEREATING: NOT AT ALL
7. TROUBLE CONCENTRATING ON THINGS, SUCH AS READING THE NEWSPAPER OR WATCHING TELEVISION: NOT AT ALL

## 2024-11-26 NOTE — PROGRESS NOTES
"Subjective     Tina is here her annual well visit.    Questions or concerns:  Nasal congestion, rhinorrhea, mild cough with fever x 1-2 days    Nutrition, Elimination, and Sleep:  Nutrition:  well-balanced diet  Elimination:  normal frequency and quality of stool  Sleep:  adequate, no snoring identified    Currently menstruating? yes; current menstrual pattern: regular every month without intermenstrual spotting - plans to get an IUD with removal of hymenal remnant 1/2025    Social:  Peer relations:  no concerns  Family relations:  no concerns; in a relationship with a man for the past 4 years  School performance:  no concerns  Teen questionnaire:  reviewed  Activities:  working on [employer] with plan to graduate August 2025       Synopsis [employer] 11/26/2024    09:12   PHQ9   Patient Health Questionnaire-9 Score 1    ASQ   1. In the past few weeks, have you wished you were dead? N    2. In the past few weeks, have you felt that you or your family would be better off if you were dead? N    3. In the past week, have you been having thoughts about killing yourself? N    4. Have you ever tried to kill yourself? N    Calculated Risk Score No intervention is necessary        Patient-reported     Objective   /65   Pulse 72   Ht 1.597 m (5' 2.88\")   Wt 54.2 kg (119 lb 8 oz)   LMP 11/04/2024 (Approximate)   BMI 21.25 kg/m²   Physical Exam  Vitals reviewed.   Constitutional:       General: She is not in acute distress.     Appearance: Normal appearance. She is not ill-appearing.   HENT:      Head: Normocephalic and atraumatic.      Right Ear: Tympanic membrane, ear canal and external ear normal.      Left Ear: Tympanic membrane, ear canal and external ear normal.      Nose: Congestion present.      Mouth/Throat:      Mouth: Mucous membranes are moist.      Pharynx: Oropharynx is clear.   Eyes:      Extraocular Movements: Extraocular movements intact.      Conjunctiva/sclera: Conjunctivae normal.      Pupils: Pupils are " equal, round, and reactive to light.   Neck:      Thyroid: No thyroid mass or thyromegaly.   Cardiovascular:      Rate and Rhythm: Normal rate and regular rhythm.      Pulses: Normal pulses.      Heart sounds: Normal heart sounds. No murmur heard.     No gallop.   Pulmonary:      Effort: Pulmonary effort is normal. No respiratory distress.      Breath sounds: Normal breath sounds.   Abdominal:      General: There is no distension.      Palpations: Abdomen is soft. There is no hepatomegaly, splenomegaly or mass.      Tenderness: There is no abdominal tenderness.      Hernia: No hernia is present.   Musculoskeletal:         General: No swelling, deformity or signs of injury. Normal range of motion.      Cervical back: Normal range of motion and neck supple.      Thoracic back: No scoliosis.   Lymphadenopathy:      Comments: no significant lymphadenopathy > 1 cm   Skin:     General: Skin is warm and dry.   Neurological:      General: No focal deficit present.      Motor: No weakness.   Psychiatric:         Mood and Affect: Mood normal.         Thought Content: Thought content normal.     Assessment/Plan   Problem List Items Addressed This Visit    None  Visit Diagnoses       Wellness examination    -  Primary    BMI 21.0-21.9, adult        Encounter for immunization        Relevant Orders    Tdap vaccine, age 7 years and older (Completed)        Tina is a healthy and thriving adult.  She has plans for a GYN procedure and routine eye follow-up.  - Guidance regarding safety, nutrition, physical activity, and sleep reviewed.  - Social:  teenage questionnaire completed and reviewed.  Issues of smoking, vaping, substance use, sexuality, and mood discussed.    - Vaccines:  as documented  - Follow-up in 1 year for an annual well exam with an internist or sooner if concerns arise

## 2024-11-29 ENCOUNTER — APPOINTMENT (OUTPATIENT)
Dept: OPHTHALMOLOGY | Facility: CLINIC | Age: 19
End: 2024-11-29
Payer: COMMERCIAL

## 2024-11-29 DIAGNOSIS — H52.03 HYPEROPIA OF BOTH EYES: ICD-10-CM

## 2024-11-29 DIAGNOSIS — H53.001 AMBLYOPIA OF RIGHT EYE: ICD-10-CM

## 2024-11-29 DIAGNOSIS — H52.31 ANISOMETROPIA: Primary | ICD-10-CM

## 2024-11-29 DIAGNOSIS — H10.13 ALLERGIC CONJUNCTIVITIS OF BOTH EYES: ICD-10-CM

## 2024-11-29 ASSESSMENT — TONOMETRY
OD_IOP_MMHG: 12
IOP_METHOD: GOLDMANN APPLANATION
OS_IOP_MMHG: 12

## 2024-11-29 ASSESSMENT — REFRACTION_MANIFEST
OS_SPHERE: +4.00
OD_SPHERE: +9.75
OS_CYLINDER: -0.75
OD_AXIS: 170
OS_AXIS: 015
OD_CYLINDER: -0.75

## 2024-11-29 ASSESSMENT — REFRACTION_CURRENTRX
OS_SPHERE: +3.00
OS_BRAND: DAILIES TOTAL 1
OS_CYLINDER: SPHERE
OD_BRAND: BIOFINITY XR
OS_SPHERE: +3.75
OD_SPHERE: +11.00
OS_BRAND: DAILIES TOTAL 1
OS_DIAMETER: 14.1
OS_DIAMETER: 14.1
OD_CYLINDER: SPHERE
OD_DIAMETER: 14.0
OS_BASECURVE: 8.5
OS_BASECURVE: 8.5
OD_BASECURVE: 8.6
OS_CYLINDER: SPHERE

## 2024-11-29 ASSESSMENT — REFRACTION_WEARINGRX
OS_CYLINDER: SPHERE
OD_AXIS: 170
OD_SPHERE: +9.00
OD_CYLINDER: -0.75
OS_SPHERE: +3.25

## 2024-11-29 ASSESSMENT — VISUAL ACUITY
METHOD: SNELLEN - LINEAR
OS_CC: 20/20
CORRECTION_TYPE: GLASSES
OD_CC+: -2
OD_CC: 20/30

## 2024-11-29 ASSESSMENT — ENCOUNTER SYMPTOMS
ENDOCRINE NEGATIVE: 0
HEMATOLOGIC/LYMPHATIC NEGATIVE: 0
CARDIOVASCULAR NEGATIVE: 0
ALLERGIC/IMMUNOLOGIC NEGATIVE: 0
EYES NEGATIVE: 0
PSYCHIATRIC NEGATIVE: 0
MUSCULOSKELETAL NEGATIVE: 0
CONSTITUTIONAL NEGATIVE: 0
NEUROLOGICAL NEGATIVE: 0
GASTROINTESTINAL NEGATIVE: 0
RESPIRATORY NEGATIVE: 0

## 2024-11-29 ASSESSMENT — CONF VISUAL FIELD
OD_NORMAL: 1
OD_SUPERIOR_NASAL_RESTRICTION: 0
OD_INFERIOR_TEMPORAL_RESTRICTION: 0
OS_SUPERIOR_TEMPORAL_RESTRICTION: 0
OS_NORMAL: 1
OS_SUPERIOR_NASAL_RESTRICTION: 0
OS_INFERIOR_NASAL_RESTRICTION: 0
OS_INFERIOR_TEMPORAL_RESTRICTION: 0
METHOD: COUNTING FINGERS
OD_INFERIOR_NASAL_RESTRICTION: 0
OD_SUPERIOR_TEMPORAL_RESTRICTION: 0

## 2024-11-29 ASSESSMENT — EXTERNAL EXAM - RIGHT EYE: OD_EXAM: NORMAL

## 2024-11-29 ASSESSMENT — SLIT LAMP EXAM - LIDS: COMMENTS: GOOD POSITION

## 2024-11-29 ASSESSMENT — EXTERNAL EXAM - LEFT EYE: OS_EXAM: NORMAL

## 2024-11-29 ASSESSMENT — CUP TO DISC RATIO
OD_RATIO: .2
OS_RATIO: .2

## 2024-11-29 NOTE — PROGRESS NOTES
Assessment/Plan   Diagnoses and all orders for this visit:  Anisometropia  Amblyopia of right eye  Allergic conjunctivitis of both eyes  Hyperopia of both eyes    A spectacle prescription was dispensed to be used as needed.    ADJUST CL RX Left eye  Provided trials of Left eye in +3.75, call to order if satisfactory

## 2024-12-16 ENCOUNTER — APPOINTMENT (OUTPATIENT)
Dept: OBSTETRICS AND GYNECOLOGY | Facility: CLINIC | Age: 19
End: 2024-12-16
Payer: COMMERCIAL

## 2024-12-19 NOTE — PREPROCEDURE INSTRUCTIONS
Pre-Op Instructions &?Checklist       Your surgery has been scheduled at [facility] at [address] [place], [address], [address], in the [facility]. Parking is to the left of the main entrance.      You will be contacted about the time of your surgery the day before your surgery (if your surgery is on a Monday, you will be called the Friday before surgery). If you are unable to answer the phone, a detailed voicemail message will be left. Make sure that your voicemail box is not full so a message can be left. If you have not received a call by 3:00 pm you may call [phone] between the hours of 3:00 and 4:00 pm. Please be available by phone the night before/day of surgery in case there is a change in the schedule which may require you to arrive earlier/later.      ?      14 DAYS BEFORE SURGERY STOP TAKING WEIGHT LOSS MEDICATIONS       ?7 DAYS BEFORE SURGERY STOP THESE MEDICATIONS:       * Multiple Vitamins containing Vitamin E       * Herbal supplements, Fish Oil, garlic pills, turmeric, CoQ enzyme       *Stop taking aspirin, aspirin-containing products, and NSAID's like Advil, Motrin, Aleve, and Ibuprofen. Tylenol is okay to take for pain relief.        *If you are currently taking Coumadin/Warfarin, we will have to coordinate that with your PCP &/or the Anticoagulation Clinic.      THE DAY BEFORE SURGERY:       *Do not eat any food after midnight the night before surgery.        *You are permitted to have no more than 4 ounces of clear liquids such as water, apple juice, plain tea or coffee (no milk or creamer), clear electrolyte-replenishing drinks such as Pedialyte, Gatorade, or         Powerade  (not yogurt or pulp-containing smoothies or juices such as orange juice) up to 3 hours before your arrival time.      DAY OF SURGERY TAKE THESE MEDICATIONS (if it is not listed, do not take it.)    There are no medications for you to take on the morning of surgery.     ON THE  MORNING OF SURGERY:       *Shower either the night before your surgery or the morning of your surgery       *Do not use moisturizers, creams, lotions or perfume, or make-up.       *Wear comfortable, loose fitting clothing.        *All jewelry and valuables should be left at home.       *Prosthetic devices such as contact lenses, hearing aids, dentures, eyelash extensions, hairpins and body piercings must be removed before surgery. Bring containers for eyeglasses/contacts, dentures, or         hearing aids with you.      ? Diabetics: Please check fasting blood sugars upon waking up. ?If fasting blood sugars are <80ml/dl, please drink 100ml/3oz. of apple juice no later than 2 hours prior to surgery.      ?BRING WITH YOU:        *Photo ID and insurance card       *Current list of medicines and allergies       *Pacemaker/Defibrillator/Heart stent cards       *Copy of your complete Advanced Directive/DHPOA-if applicable      ?SMOKING:       *Quitting smoking can make a huge difference to your health and recovery from surgery. ?       *If you need help with quitting, call [phone].        ALCOHOL:       *No alcoholic beverages for 48 hours before surgery.      ?AFTER OUTPATIENT SURGERY:       *A responsible adult MUST accompany you at the time of discharge and stay with you for 24 hours after your surgery.       *You may NOT drive yourself home after surgery.       *You may use a taxi or ride sharing service ([employer], [employer]) to return home ONLY if you are accompanied by a friend or family member       *Instructions for resuming your medications will be provided by your surgeon.      CONTACT SURGEON'S OFFICE IF YOU DEVELOP:       *Fever =/>?100.4 F        *New respiratory symptoms (e.g. cough, shortness of breath, respiratory distress, sore throat)       *Recent loss of taste or smell       *Flu like symptoms such as headache, fatigue or gastrointestinal symptoms       *If you develop any open sores, shingles, burning or  painful urination    AND/OR:       *You no longer wish to have the surgery.       *Any other personal circumstances change that may lead to the need to cancel or defer this surgery.       *You were admitted to any hospital within one week of your planned procedure.      ?If you have any questions regarding these preoperative instructions, you may call [phone]. If you have questions regarding you surgical procedure, or post-operative care/recovery please call your surgeon's office.      Link to [facility] Laboratory Services Locations   [email or web address]      Link to [facility] [employer]   [email or web address]\

## 2024-12-19 NOTE — CPM/PAT H&P
CPM/PAT Evaluation       Name: Tina Neal   /Age: 2005       TELEMEDICINE ENCOUNTER  Patient was interviewed by telephone for preadmission testing perioperative risk assessment prior to surgery.    DATE OF CONSULT: 2024  REFERRING PROVIDER: Dr. 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  The problem has been persistent and she is interested in pursuing surgical correction.  She denies any vaginal discharge, or dysuria    ACTIVE PROBLEMS  Patient Active Problem List   Diagnosis    GPC (giant papillary conjunctivitis)    Dry eye syndrome of both eyes    Congenital nevus    Chronic follicular conjunctivitis of both eyes    Anisometropic amblyopia of right eye    Hyperopia of right eye with astigmatism    Anisometropia    Amblyopia of right eye    Allergic conjunctivitis of both eyes    Acute follicular conjunctivitis, left    Vitamin D insufficiency    Wears glasses    Hymenal remnant    Encounter for IUD insertion    Dyspareunia in female        PAST MEDICAL HISTORY  Past Medical History:   Diagnosis Date    COVID-19 virus detected 2023    Dysmenorrhea 2023    Irregular periods 2023    Personal history of other diseases of the respiratory system 2014    History of asthma    Personal history of other diseases of the respiratory system 2014    History of chronic sinusitis    Vocal cord dysfunction 2023        SURGICAL HISTORY  Past Surgical History:   Procedure Laterality Date    MOLE REMOVAL Right     from right leg       ANESTHESIA HISTORY  Denies problems with anesthesia in the past such as PONV, prolonged sedation, awareness, dental damage, aspiration, cardiac arrest, difficult intubation, or unexpected hospital admissions. Denies family history of malignant  "hyperthermia, or pseudocholinesterase deficiency.     SOCIAL HISTORY  Never smoker; occasional alcohol use; no recreational drug use.  Patient states she exercises 2-3 times a week doing a combination of cardio and strength training.  She states she is able to do moderate to vigorous ADLs.  She denies chest pain or shortness of breath.  METS >4    FAMILY HISTORY  Family History   Problem Relation Name Age of Onset    Diabetes type I Father          ALLERGIES  No Known Allergies     MEDICATIONS  No current facility-administered medications for this encounter.    Current Outpatient Medications:     melatonin 3 mg tablet, Take by mouth., Disp: , Rfl:     mv-calcium-min-iron fm-FA-vitK (Multi For Her) 18 mg iron-600 mcg-80 mcg tablet, Take by mouth., Disp: , Rfl:     norgestimate-ethinyl estradioL (Sprintec, 28,) 0.25-35 mg-mcg tablet, Take 1 tablet by mouth once daily., Disp: 84 tablet, Rfl: 0     REVIEW OF SYSTEMS  Review of Systems   Genitourinary:         Painful hymen remnant   All other systems reviewed and are negative.    STOP BANG:  Negative for GEE    PHYSICAL EXAM  Deferred    AIRWAY EXAM  Deferred    VITALS  No vitals taken for telemedicine visit  BMI Readings from Last 1 Encounters:   11/26/24 21.25 kg/m²      BP Readings from Last 4 Encounters:   11/26/24 100/65   11/11/24 92/64   11/22/23 118/78   08/08/23 104/70        LABS  Lab Results   Component Value Date    WBC 6.5 08/08/2023    HGB 13.9 08/08/2023    HCT 41.2 08/08/2023    MCV 87 08/08/2023     08/08/2023      Lab Results   Component Value Date    GLUCOSE 92 08/08/2023    CALCIUM 9.6 08/08/2023     08/08/2023    K 4.3 08/08/2023    CO2 26 08/08/2023     08/08/2023    BUN 10 08/08/2023    CREATININE 0.60 08/08/2023      No results found for: \"HGBA1C\"   Lab Results   Component Value Date    CHOL 138 04/23/2021     Lab Results   Component Value Date    HDL 46.9 04/23/2021     No results found for: \"LDLCALC\"  Lab Results   Component " Value Date    TRIG 71 04/23/2021     Active lab orders for CBC, BMP placed by Dr. Leon.      ASSESSMENT/PLAN  Septate hymen  Hymenoplasty      Preoperative instructions reviewed in detail with patient during this encounter. A copy of these instructions has been unloaded to  [employer] along with a copy sent to either home email address or mailed to home address.  This note was created in part upon personal review of patient's medical records.  Speech recognition transcription software was used in the creation of this note. Despite proofreading, several typographical errors might be present that might affect the meaning of the content.

## 2025-01-07 PROBLEM — Q52.4 SEPTATE HYMEN: Status: ACTIVE | Noted: 2024-11-11

## 2025-01-08 ENCOUNTER — ANESTHESIA EVENT (OUTPATIENT)
Dept: OPERATING ROOM | Facility: CLINIC | Age: 20
End: 2025-01-08
Payer: COMMERCIAL

## 2025-01-08 ASSESSMENT — ENCOUNTER SYMPTOMS
DYSURIA: 0
DIFFICULTY URINATING: 0
ABDOMINAL PAIN: 0

## 2025-01-09 ENCOUNTER — ANESTHESIA (OUTPATIENT)
Dept: OPERATING ROOM | Facility: CLINIC | Age: 20
End: 2025-01-09
Payer: COMMERCIAL

## 2025-01-09 ENCOUNTER — HOSPITAL ENCOUNTER (OUTPATIENT)
Facility: CLINIC | Age: 20
Setting detail: OUTPATIENT SURGERY
Discharge: HOME | End: 2025-01-09
Attending: OBSTETRICS & GYNECOLOGY | Admitting: OBSTETRICS & GYNECOLOGY
Payer: COMMERCIAL

## 2025-01-09 VITALS
RESPIRATION RATE: 16 BRPM | HEIGHT: 62 IN | HEART RATE: 45 BPM | TEMPERATURE: 97.3 F | WEIGHT: 122.8 LBS | OXYGEN SATURATION: 100 % | BODY MASS INDEX: 22.6 KG/M2 | DIASTOLIC BLOOD PRESSURE: 55 MMHG | SYSTOLIC BLOOD PRESSURE: 113 MMHG

## 2025-01-09 DIAGNOSIS — Q52.4 SEPTATE HYMEN: Primary | ICD-10-CM

## 2025-01-09 PROBLEM — Z30.430 ENCOUNTER FOR IUD INSERTION: Status: RESOLVED | Noted: 2023-11-22 | Resolved: 2025-01-09

## 2025-01-09 LAB — PREGNANCY TEST URINE, POC: NEGATIVE

## 2025-01-09 PROCEDURE — 3700000002 HC GENERAL ANESTHESIA TIME - EACH INCREMENTAL 1 MINUTE: Performed by: OBSTETRICS & GYNECOLOGY

## 2025-01-09 PROCEDURE — A56700 PR PARTIAL REMOVAL OF HYMEN: Performed by: ANESTHESIOLOGY

## 2025-01-09 PROCEDURE — 3700000001 HC GENERAL ANESTHESIA TIME - INITIAL BASE CHARGE: Performed by: OBSTETRICS & GYNECOLOGY

## 2025-01-09 PROCEDURE — 56700 PRTL HYMNCTMY/REVJ HYMNL RNG: CPT | Performed by: OBSTETRICS & GYNECOLOGY

## 2025-01-09 PROCEDURE — 88304 TISSUE EXAM BY PATHOLOGIST: CPT | Mod: TC,SUR | Performed by: OBSTETRICS & GYNECOLOGY

## 2025-01-09 PROCEDURE — 88304 TISSUE EXAM BY PATHOLOGIST: CPT | Performed by: PATHOLOGY

## 2025-01-09 PROCEDURE — 3600000002 HC OR TIME - INITIAL BASE CHARGE - PROCEDURE LEVEL TWO: Performed by: OBSTETRICS & GYNECOLOGY

## 2025-01-09 PROCEDURE — 2500000004 HC RX 250 GENERAL PHARMACY W/ HCPCS (ALT 636 FOR OP/ED)

## 2025-01-09 PROCEDURE — 2500000004 HC RX 250 GENERAL PHARMACY W/ HCPCS (ALT 636 FOR OP/ED): Performed by: OBSTETRICS & GYNECOLOGY

## 2025-01-09 PROCEDURE — 7100000009 HC PHASE TWO TIME - INITIAL BASE CHARGE: Performed by: OBSTETRICS & GYNECOLOGY

## 2025-01-09 PROCEDURE — 6360000003 HC OR 636 NO HCPCS: Performed by: OBSTETRICS & GYNECOLOGY

## 2025-01-09 PROCEDURE — 2500000005 HC RX 250 GENERAL PHARMACY W/O HCPCS: Performed by: OBSTETRICS & GYNECOLOGY

## 2025-01-09 PROCEDURE — 3600000007 HC OR TIME - EACH INCREMENTAL 1 MINUTE - PROCEDURE LEVEL TWO: Performed by: OBSTETRICS & GYNECOLOGY

## 2025-01-09 PROCEDURE — 58300 INSERT INTRAUTERINE DEVICE: CPT | Performed by: OBSTETRICS & GYNECOLOGY

## 2025-01-09 PROCEDURE — A56700 PR PARTIAL REMOVAL OF HYMEN

## 2025-01-09 PROCEDURE — 7100000010 HC PHASE TWO TIME - EACH INCREMENTAL 1 MINUTE: Performed by: OBSTETRICS & GYNECOLOGY

## 2025-01-09 RX ORDER — FENTANYL CITRATE 50 UG/ML
50 INJECTION, SOLUTION INTRAMUSCULAR; INTRAVENOUS EVERY 5 MIN PRN
Status: DISCONTINUED | OUTPATIENT
Start: 2025-01-09 | End: 2025-01-09 | Stop reason: HOSPADM

## 2025-01-09 RX ORDER — ALBUTEROL SULFATE 0.83 MG/ML
2.5 SOLUTION RESPIRATORY (INHALATION) ONCE AS NEEDED
Status: DISCONTINUED | OUTPATIENT
Start: 2025-01-09 | End: 2025-01-09 | Stop reason: HOSPADM

## 2025-01-09 RX ORDER — LABETALOL HYDROCHLORIDE 5 MG/ML
5 INJECTION, SOLUTION INTRAVENOUS ONCE AS NEEDED
Status: DISCONTINUED | OUTPATIENT
Start: 2025-01-09 | End: 2025-01-09 | Stop reason: HOSPADM

## 2025-01-09 RX ORDER — FENTANYL CITRATE 50 UG/ML
INJECTION, SOLUTION INTRAMUSCULAR; INTRAVENOUS AS NEEDED
Status: DISCONTINUED | OUTPATIENT
Start: 2025-01-09 | End: 2025-01-09

## 2025-01-09 RX ORDER — SODIUM CHLORIDE 0.9 % (FLUSH) 0.9 %
SYRINGE (ML) INJECTION CONTINUOUS PRN
Status: DISCONTINUED | OUTPATIENT
Start: 2025-01-09 | End: 2025-01-09

## 2025-01-09 RX ORDER — KETOROLAC TROMETHAMINE 30 MG/ML
INJECTION, SOLUTION INTRAMUSCULAR; INTRAVENOUS AS NEEDED
Status: DISCONTINUED | OUTPATIENT
Start: 2025-01-09 | End: 2025-01-09

## 2025-01-09 RX ORDER — CHLORHEXIDINE GLUCONATE 40 MG/ML
SOLUTION TOPICAL AS NEEDED
Status: DISCONTINUED | OUTPATIENT
Start: 2025-01-09 | End: 2025-01-09 | Stop reason: HOSPADM

## 2025-01-09 RX ORDER — ACETAMINOPHEN 325 MG/1
650 TABLET ORAL EVERY 4 HOURS PRN
Status: DISCONTINUED | OUTPATIENT
Start: 2025-01-09 | End: 2025-01-09 | Stop reason: HOSPADM

## 2025-01-09 RX ORDER — METOCLOPRAMIDE HYDROCHLORIDE 5 MG/ML
10 INJECTION INTRAMUSCULAR; INTRAVENOUS ONCE AS NEEDED
Status: DISCONTINUED | OUTPATIENT
Start: 2025-01-09 | End: 2025-01-09 | Stop reason: HOSPADM

## 2025-01-09 RX ORDER — ONDANSETRON HYDROCHLORIDE 2 MG/ML
4 INJECTION, SOLUTION INTRAVENOUS ONCE AS NEEDED
Status: DISCONTINUED | OUTPATIENT
Start: 2025-01-09 | End: 2025-01-09 | Stop reason: HOSPADM

## 2025-01-09 RX ORDER — LIDOCAINE HYDROCHLORIDE 10 MG/ML
0.1 INJECTION, SOLUTION EPIDURAL; INFILTRATION; INTRACAUDAL; PERINEURAL ONCE
Status: DISCONTINUED | OUTPATIENT
Start: 2025-01-09 | End: 2025-01-09 | Stop reason: HOSPADM

## 2025-01-09 RX ORDER — MIDAZOLAM HYDROCHLORIDE 1 MG/ML
INJECTION, SOLUTION INTRAMUSCULAR; INTRAVENOUS AS NEEDED
Status: DISCONTINUED | OUTPATIENT
Start: 2025-01-09 | End: 2025-01-09

## 2025-01-09 RX ORDER — FENTANYL CITRATE 50 UG/ML
25 INJECTION, SOLUTION INTRAMUSCULAR; INTRAVENOUS EVERY 5 MIN PRN
Status: DISCONTINUED | OUTPATIENT
Start: 2025-01-09 | End: 2025-01-09 | Stop reason: HOSPADM

## 2025-01-09 RX ORDER — LIDOCAINE HYDROCHLORIDE 10 MG/ML
INJECTION, SOLUTION INFILTRATION; PERINEURAL AS NEEDED
Status: DISCONTINUED | OUTPATIENT
Start: 2025-01-09 | End: 2025-01-09 | Stop reason: HOSPADM

## 2025-01-09 RX ORDER — PROPOFOL 10 MG/ML
INJECTION, EMULSION INTRAVENOUS CONTINUOUS PRN
Status: DISCONTINUED | OUTPATIENT
Start: 2025-01-09 | End: 2025-01-09

## 2025-01-09 RX ORDER — IBUPROFEN 600 MG/1
600 TABLET ORAL 4 TIMES DAILY PRN
Qty: 90 TABLET | Refills: 0 | Status: SHIPPED | OUTPATIENT
Start: 2025-01-09

## 2025-01-09 RX ORDER — SODIUM CHLORIDE 0.9 G/100ML
IRRIGANT IRRIGATION AS NEEDED
Status: DISCONTINUED | OUTPATIENT
Start: 2025-01-09 | End: 2025-01-09 | Stop reason: HOSPADM

## 2025-01-09 RX ADMIN — KETOROLAC TROMETHAMINE 30 MG: 30 INJECTION, SOLUTION INTRAMUSCULAR; INTRAVENOUS at 08:04

## 2025-01-09 RX ADMIN — FENTANYL CITRATE 25 MCG: 0.05 INJECTION, SOLUTION INTRAMUSCULAR; INTRAVENOUS at 07:55

## 2025-01-09 RX ADMIN — FENTANYL CITRATE 25 MCG: 0.05 INJECTION, SOLUTION INTRAMUSCULAR; INTRAVENOUS at 07:32

## 2025-01-09 RX ADMIN — Medication: at 07:45

## 2025-01-09 RX ADMIN — PROPOFOL 50 MG: 10 INJECTION, EMULSION INTRAVENOUS at 07:33

## 2025-01-09 RX ADMIN — FENTANYL CITRATE 25 MCG: 0.05 INJECTION, SOLUTION INTRAMUSCULAR; INTRAVENOUS at 07:45

## 2025-01-09 RX ADMIN — FENTANYL CITRATE 25 MCG: 0.05 INJECTION, SOLUTION INTRAMUSCULAR; INTRAVENOUS at 08:05

## 2025-01-09 RX ADMIN — MIDAZOLAM 2 MG: 1 INJECTION INTRAMUSCULAR; INTRAVENOUS at 07:25

## 2025-01-09 RX ADMIN — PROPOFOL 125 MCG/KG/MIN: 10 INJECTION, EMULSION INTRAVENOUS at 07:32

## 2025-01-09 SDOH — HEALTH STABILITY: MENTAL HEALTH: CURRENT SMOKER: 0

## 2025-01-09 ASSESSMENT — PAIN SCALES - GENERAL
PAINLEVEL_OUTOF10: 0 - NO PAIN
PAIN_LEVEL: 0

## 2025-01-09 ASSESSMENT — COLUMBIA-SUICIDE SEVERITY RATING SCALE - C-SSRS
1. IN THE PAST MONTH, HAVE YOU WISHED YOU WERE DEAD OR WISHED YOU COULD GO TO SLEEP AND NOT WAKE UP?: NO
2. HAVE YOU ACTUALLY HAD ANY THOUGHTS OF KILLING YOURSELF?: NO
6. HAVE YOU EVER DONE ANYTHING, STARTED TO DO ANYTHING, OR PREPARED TO DO ANYTHING TO END YOUR LIFE?: NO

## 2025-01-09 ASSESSMENT — PAIN - FUNCTIONAL ASSESSMENT
PAIN_FUNCTIONAL_ASSESSMENT: 0-10

## 2025-01-09 NOTE — ANESTHESIA PREPROCEDURE EVALUATION
Patient: Tina Neal    Procedure Information       Anesthesia Start Date/Time: 01/09/25 0728    Procedures:       HYMENOPLASTY (Vagina )      INSERTION, INTRAUTERINE DEVICE (Vagina ) - This is excision of hymen septum and placement of a progesterone-containing IUD    Location: [facility] OR 03 / [facility]    Surgeons: Julieta Leon MD          Vitals:    01/09/25 0706   BP: 124/58   Pulse: (!) 48   Resp: 16   Temp: 37 °C (98.6 °F)   SpO2: 99%       Past Surgical History:   Procedure Laterality Date   • MOLE REMOVAL Right     from right leg     Past Medical History:   Diagnosis Date   • COVID-19 virus detected 06/22/2023   • Dysmenorrhea 06/22/2023   • Irregular periods 06/22/2023   • Personal history of other diseases of the respiratory system 12/08/2014    History of asthma   • Personal history of other diseases of the respiratory system 12/08/2014    History of chronic sinusitis   • Vocal cord dysfunction 06/22/2023       Current Facility-Administered Medications:   •  chlorhexidine (Hibiclens) 4 % liquid, , , PRN, Julieta Leon MD, 1 Application at 01/09/25 0739  •  lidocaine (Xylocaine) 10 mg/mL (1 %) injection, , , PRN, Julieta Leon MD, 10 mL at 01/09/25 0747  •  sodium chloride 0.9 % irrigation solution, , , PRN, Julieta Leon MD, 100 mL at 01/09/25 0748  •  surgical lubricant gel, , , PRN, Julieta Leon MD, 1 Application at 01/09/25 0738    Facility-Administered Medications Ordered in Other Encounters:   •  fentaNYL PF (Sublimaze) injection, , intravenous, PRN, ERNIE Magaña, 25 mcg at 01/09/25 0745  •  midazolam (Versed) injection, , intravenous, PRN, ERNIE Magaña, 2 mg at 01/09/25 0725  •  propofol (Diprivan) infusion, , intravenous, Continuous PRN, ERNIE Magaña, Last Rate: 58.485 mL/hr at 01/09/25 0746, 175 mcg/kg/min at 01/09/25 0746  •  sodium chloride 0.9% flush, , intravenous, Continuous PRN, Veena  "YVES Grande, CAA, New Bag at 01/09/25 0745  Prior to Admission medications    Medication Sig Start Date End Date Taking? Authorizing Provider   mv-calcium-min-iron fm-FA-vitK (Multi For Her) 18 mg iron-600 mcg-80 mcg tablet Take by mouth.   Yes Historical Provider, MD   norgestimate-ethinyl estradioL (Sprintec, 28,) 0.25-35 mg-mcg tablet Take 1 tablet by mouth once daily. 11/11/24  Yes Julieta Leon MD   ibuprofen 600 mg tablet Take 1 tablet (600 mg) by mouth 4 times a day as needed for mild pain (1 - 3) (pain). 1/9/25   Julieta Leon MD   melatonin 3 mg tablet Take by mouth.  Patient not taking: Reported on 1/9/2025 5/3/22   Historical Provider, MD     No Known Allergies  Social History     Tobacco Use   • Smoking status: Never   • Smokeless tobacco: Never   Substance Use Topics   • Alcohol use: Yes     Comment: social         Chemistry    Lab Results   Component Value Date/Time     08/08/2023 1617    K 4.3 08/08/2023 1617     08/08/2023 1617    CO2 26 08/08/2023 1617    BUN 10 08/08/2023 1617    CREATININE 0.60 08/08/2023 1617    Lab Results   Component Value Date/Time    CALCIUM 9.6 08/08/2023 1617    ALKPHOS 54 08/08/2023 1617    AST 13 08/08/2023 1617    ALT 10 08/08/2023 1617    BILITOT 0.8 08/08/2023 1617          Lab Results   Component Value Date/Time    WBC 6.5 08/08/2023 1617    HGB 13.9 08/08/2023 1617    HCT 41.2 08/08/2023 1617     08/08/2023 1617     No results found for: \"PROTIME\", \"PTT\", \"INR\"  No results found for this or any previous visit (from the past 4464 hours).  No results found for this or any previous visit from the past 1095 days.        Relevant Problems   No relevant active problems       Clinical information reviewed:   Tobacco  Allergies  Meds  Problems  Med Hx  Surg Hx  OB Status    Fam Hx  Soc Hx        NPO Detail:  NPO/Void Status  Date of Last Liquid: 01/08/25  Time of Last Liquid: 2200  Date of Last Solid: 01/08/25  Time of Last Solid: " 2200         Physical Exam    Airway  Mallampati: I  TM distance: >3 FB  Neck ROM: full     Cardiovascular   Rhythm: regular  Rate: normal     Dental - normal exam     Pulmonary   Breath sounds clear to auscultation     Abdominal        Anesthesia Plan    History of general anesthesia?: yes  History of complications of general anesthesia?: no    ASA 1     MAC     The patient is not a current smoker.    intravenous induction   Anesthetic plan and risks discussed with patient and father.    Plan discussed with CAA.

## 2025-01-09 NOTE — OP NOTE
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]     Procedures  HYMENOPLASTY  99687 - OR PARTIAL HYMENECTOMY OR REVISION HYMENAL RING    INSERTION, INTRAUTERINE DEVICE  63792 - OR INSERTION INTRAUTERINE DEVICE IUD      Surgeons      * Julieta Leon - Primary    Resident/Fellow/Other Assistant:  Surgeons and Role:  * No surgeons found with a matching role *    Staff:   Circulator: Katerina  Circulator: Elena Torrez Person: Lolis    Anesthesia Staff: Anesthesiologist: Serina Nix MD  C-AA: ERNIE Magaña    Procedure Summary  Anesthesia: Monitor Anesthesia Care  ASA: I  Estimated Blood Loss: 3mL  Intra-op Medications:   Administrations occurring from 0730 to 0830 on 25:   Medication Name Total Dose   sodium chloride 0.9 % irrigation solution 100 mL   levonorgestrel (Mirena) 20 mcg/24hr IUD 1 each   chlorhexidine (Hibiclens) 4 % liquid 1 Application   lidocaine (Xylocaine) 10 mg/mL (1 %) injection 10 mL   surgical lubricant gel 1 Application   fentaNYL PF 0.05 mg/mL 100 mcg   ketorolac (Toradol) 30 mg 30 mg   50 mL propofol 10mg/mL 360.53 mg   sodium chloride 0.9 % flush Cannot be calculated              Anesthesia Record               Intraprocedure I/O Totals          Intake    sodium chloride 0.9 % flush 20.00 mL    Propofol Drip 36.05 mL    The total shown is the total volume documented since Anesthesia Start was filed.    Total Intake 56.05 mL          Specimen:   ID Type Source Tests Collected by Time   1 : hymen septum Tissue [facility] EXAM Julieta Leon MD 2025 0804                 Drains and/or Catheters:   [REMOVED] Urethral Catheter 10 Fr. (Removed)       Tourniquet Times:          Implants:  Implants       Type Name Action Serial No.       52MG MIRENA LEVONORGESTREL-RELEASING INTRAUTERINE SYSTEM Implanted               Findings: There is a hymenal band of tissue from 12:00 to 6:00.  The posterior insertion is wide.  Exam under anesthesia shows no masses.  The Mirena was inserted without difficulty.    Indications: Tina Neal is an 19 y.o. 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  The hymen is complicating use of tampons.  Also desires progesterone containing IUD insertion.    The patient was seen in the preoperative area. The risks, benefits, complications, treatment options, non-operative alternatives, expected recovery and outcomes were discussed with the patient. The possibilities of reaction to medication, pulmonary aspiration, injury to surrounding structures, bleeding, recurrent infection, the need for additional procedures, failure to diagnose a condition, and creating a complication requiring transfusion or operation were discussed with the patient. The patient concurred with the proposed plan, giving informed consent.  The site of surgery was properly noted/marked if necessary per policy. The patient has been actively warmed in preoperative area. Preoperative antibiotics are not indicated. Venous thrombosis prophylaxis are not indicated.    Procedure Details: This is a 19-year-old female that has a hymenal band of tissue from 12-6 o'clock.  It is inhibiting activities and use of tampons.  She does use birth control pills for contraception but desires a Mirena IUD.  She is brought to the operating room where MAC anesthesia was given without complications.  Legs were placed in the Stefano stirrups.  Exam under anesthesia was performed that notes the wide hymenal septum.  1% lidocaine without epinephrine was infused at the 12:00 and 6:00 insertion sites of the hymenal band.   A 10 Georgian red rubber catheter was placed within the urethra.  A scalpel was used to  excise the hymenal band at the 12:00 location.  3-0 Monocryl suture was then used to approximate the vaginal mucosa.  Hemostasis was excellent.  The band was then grasped and Metzenbaum scissors were then used to excise the wide attachment of the hymenal band from the 6:00 location.  The 3-0 Monocryl suture was used to reapproximate the vaginal mucosa, hemostasis was excellent.  When the hymenal band was removed, a speculum was then placed.  The anterior lip of the cervix was grasped with a single-tooth tenaculum and the uterine sound was placed and noted the uterus to be 7 cm.  The Mirena IUD was then loaded in the applicator and the IUD was inserted easily.  The strings were trimmed to about 1.5 cm in length.  The single-tooth was removed from the anterior lip of the cervix, hemostasis was excellent.  She did receive IV Toradol intraoperatively.  She was replaced in the supine position and awakened from MAC anesthesia without complications.  She was transferred to recovery room in stable condition.   Complications:  None; patient tolerated the procedure well.    Disposition: PACU - hemodynamically stable.  Condition: stable                 Additional Details: The Mirena IUD will  in 2033.    Attending Attestation: I performed the procedure.    Julieta Leon  Phone Number: [phone]

## 2025-01-09 NOTE — DISCHARGE INSTRUCTIONS
You had removal of a septum of the hymen.  You will have sutures near the urethra and the posterior vaginal opening.  They will dissolve on their own.  I recommend rinsing that area with warm tap water with a Ariane- wash bottle.  If you have a foul discharge or pain, call the office.  You had insertion of Mirena IUD.  You may have irregular bleeding or cramping.  Use 600 mg ibuprofen every 6 hours alternating with Tylenol and heat.  If you have foul discharge, fever over 100 °F or excessive pain, let me know.    *NO NSAIDS/MOTRIN/IBUPROFEN BEFORE 2 PM*

## 2025-01-09 NOTE — ANESTHESIA POSTPROCEDURE EVALUATION
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])    Surgeons: Julieta Leon MD Responsible Provider: Serina Nix MD    Anesthesia Type: MAC ASA Status: 1            Anesthesia Type: MAC    Vitals Value Taken Time   BP 86 51 01/09/25 0817   Temp 36.2 01/09/25 0817   Pulse 58 01/09/25 0817   Resp 10 01/09/25 0817   SpO2 97 01/09/25 0817       Anesthesia Post Evaluation    Patient location during evaluation: PACU  Patient participation: complete - patient participated  Level of consciousness: awake and alert  Pain score: 0  Pain management: adequate  Airway patency: patent  Cardiovascular status: acceptable  Respiratory status: acceptable and room air  Hydration status: acceptable  Postoperative Nausea and Vomiting: none        There were no known notable events for this encounter.

## 2025-01-09 NOTE — H&P
History Of Present Illness  Tina Neal is a 19 y.o. female presenting with hymen septum noted on exam.  Noted to be at the 12 to 6 o'clock location.  It has been difficult to use tampons.  The space to the right of the septum is larger than the left.  Recent Pap and cultures were negative.  She does also wish to have a Mirena IUD inserted intraoperatively.  She is currently on birth control pills.       Past Medical History  Past Medical History:   Diagnosis Date    COVID-19 virus detected 06/22/2023    Dysmenorrhea 06/22/2023    Irregular periods 06/22/2023    Personal history of other diseases of the respiratory system 12/08/2014    History of asthma    Personal history of other diseases of the respiratory system 12/08/2014    History of chronic sinusitis    Vocal cord dysfunction 06/22/2023       Surgical History  Past Surgical History:   Procedure Laterality Date    MOLE REMOVAL Right     from right leg        Social History  She reports that she has never smoked. She has never used smokeless tobacco. She reports current alcohol use. She reports that she does not use drugs.    Family History  Family History   Problem Relation Name Age of Onset    Diabetes type I Father          Allergies  Patient has no known allergies.    Review of Systems   Gastrointestinal:  Negative for abdominal pain.   Genitourinary:  Positive for vaginal pain. Negative for difficulty urinating, dysuria and menstrual problem.        Constitutional: Alert and in no acute distress. Well developed, well nourished.   Head and Face: Head and face: Normal.    Eyes: Normal external exam - nonicteric sclera  Pulmonary: No respiratory distress.   Abdomen: Soft nontender; no abdominal mass palpated. No organomegaly. No hernias.   Genitourinary: External genitalia: There is a hymen septum noted, the band is wide at the 12:00 location, extends to the 6:00 location no inguinal lymphadenopathy. Bartholin's Urethral and Skenes Glands: Normal.  Urethra: Normal.  Bladder: Normal on palpation. Vagina: Normal. Cervix: Normal.  Uterus: Normal.   Musculoskeletal: No joint swelling seen, normal movements of all extremities.   Skin: Normal skin color and pigmentation, normal skin turgor, and no rash.   Neurologic: Non-focal. Grossly intact.   Psychiatric: Alert and oriented x 3. Affect normal to patient baseline. Mood: Appropriate.  Physical Exam     Last Recorded Vitals  There were no vitals taken for this visit.    Relevant Results    No current facility-administered medications for this encounter.    Current Outpatient Medications:     melatonin 3 mg tablet, Take by mouth., Disp: , Rfl:     mv-calcium-min-iron fm-FA-vitK (Multi For Her) 18 mg iron-600 mcg-80 mcg tablet, Take by mouth., Disp: , Rfl:     norgestimate-ethinyl estradioL (Sprintec, 28,) 0.25-35 mg-mcg tablet, Take 1 tablet by mouth once daily., Disp: 84 tablet, Rfl: 0   No results found.   No results found for this or any previous visit (from the past 96 hours).      Assessment/Plan   Assessment & Plan  Hymenal remnant    Septate hymen    This is a 19-year-old female that has a septate hymen.  She desires excision.  After excision, a progesterone-containing IUD will be placed.  Informed consent was obtained.       I spent 40 minutes in the professional and overall care of this patient.      Julieta Leon MD

## 2025-01-16 ENCOUNTER — TELEPHONE (OUTPATIENT)
Dept: OPHTHALMOLOGY | Facility: CLINIC | Age: 20
End: 2025-01-16
Payer: COMMERCIAL

## 2025-01-16 NOTE — TELEPHONE ENCOUNTER
Wanted to order 1 year supply of contacts medically necessary.  No longer has EM from what I can tell she now has VSP.  We do not take for vision materials.  She is talking to her mom and will call me back.

## 2025-01-20 LAB
LABORATORY COMMENT REPORT: NORMAL
PATH REPORT.FINAL DX SPEC: NORMAL
PATH REPORT.GROSS SPEC: NORMAL
PATH REPORT.RELEVANT HX SPEC: NORMAL
PATH REPORT.TOTAL CANCER: NORMAL

## 2025-01-24 ENCOUNTER — APPOINTMENT (OUTPATIENT)
Dept: OBSTETRICS AND GYNECOLOGY | Facility: CLINIC | Age: 20
End: 2025-01-24
Payer: COMMERCIAL

## 2025-01-24 VITALS
HEART RATE: 90 BPM | DIASTOLIC BLOOD PRESSURE: 66 MMHG | SYSTOLIC BLOOD PRESSURE: 103 MMHG | BODY MASS INDEX: 22.31 KG/M2 | WEIGHT: 122 LBS

## 2025-01-24 DIAGNOSIS — Z30.431 IUD CHECK UP: ICD-10-CM

## 2025-01-24 DIAGNOSIS — Z48.02 ENCOUNTER FOR REMOVAL OF SUTURES: Primary | ICD-10-CM

## 2025-01-24 PROCEDURE — 99214 OFFICE O/P EST MOD 30 MIN: CPT | Performed by: OBSTETRICS & GYNECOLOGY

## 2025-01-24 NOTE — PROGRESS NOTES
Tina Neal is a 19 y.o. female who presents with a chief complaint of Post-op (Post op)  SUBJECTIVE  She had excision of hymenal septum on 2025 then insertion of Mirena IUD.  She is currently on her menses, she denies a significant change in flow.  There is no cramping.    No pelvic pain, no dysuria or change in bowel habits.    She denies any concerns at the incision sites except that there continues to be some suture that she noted today.  No foul odor.    Past Medical History:   Diagnosis Date    COVID-19 virus detected 2023    Dysmenorrhea 2023    Irregular periods 2023    Personal history of other diseases of the respiratory system 2014    History of asthma    Personal history of other diseases of the respiratory system 2014    History of chronic sinusitis    Vocal cord dysfunction 2023     Past Surgical History:   Procedure Laterality Date    MOLE REMOVAL Right     from right leg    PARTIAL HYMENECTOMY  2025    and placement of IUD     Social History     Socioeconomic History    Marital status: Single   Tobacco Use    Smoking status: Never    Smokeless tobacco: Never   Vaping Use    Vaping status: Some Days    Substances: Nicotine    Devices: Disposable   Substance and Sexual Activity    Alcohol use: Yes     Comment: social    Drug use: Never    Sexual activity: Yes     Partners: Male     Birth control/protection: OCP     Family History   Problem Relation Name Age of Onset    Diabetes type I Father         OB History    Para Term  AB Living   0 0 0 0 0 0   SAB IAB Ectopic Multiple Live Births   0 0 0 0 0       OBJECTIVE  No Known Allergies   (Not in a hospital admission)       Review of Systems  Negative except patient notes suture excision sites of hymenal septum.  Physical Exam  General Appearance: awake, alert, oriented, in no acute distress and well developed, well nourished.  On exam mucousy menses is noted at the vaginal opening.   With palpation there is a suture knot noted on the lower left edge of the repair, and upper location at 12:00 another suture remnant is noted.  There is no evidence of infection.    A speculum was placed and the IUD strings are visible, and normal length.  Menses is noted.  Bimanual exam is nontender, no masses.    Pickups were used to grasp the suture at each location and under gentle tension the suture knot was removed.  Digital exam confirms no obvious suture remnants present.    /66   Pulse 90   Wt 55.3 kg (122 lb)   BMI 22.31 kg/m²    Problem List Items Addressed This Visit    None  This is a 19-year-old female that is status post excision of a hymen septum on January 9, 2025.  Remaining suture knots were excised.    The Mirena IUD appears to be in the proper location by exam.  She will call me with concerns.    Her routine follow-up will be in November 2025.

## 2025-04-25 ENCOUNTER — TELEPHONE (OUTPATIENT)
Dept: PEDIATRICS | Facility: CLINIC | Age: 20
End: 2025-04-25
Payer: COMMERCIAL

## 2025-04-25 NOTE — TELEPHONE ENCOUNTER
Tina went to urgent care this morning for 2 weeks of neck pain. She was prescribed the following:    Prednisone 20 mg  Ibuprofen 800 mg  Cyclodenzaprine HCL    Tina is not seeing an adult PCP yet, and she would like your opinion regarding the medications. Tina stated that it is OK to respond through My Chart. Thanks     [phone]

## 2025-11-21 ENCOUNTER — APPOINTMENT (OUTPATIENT)
Facility: CLINIC | Age: 20
End: 2025-11-21
Payer: COMMERCIAL

## 2025-12-05 ENCOUNTER — APPOINTMENT (OUTPATIENT)
Dept: OPHTHALMOLOGY | Facility: CLINIC | Age: 20
End: 2025-12-05
Payer: COMMERCIAL

## 2025-12-12 ENCOUNTER — APPOINTMENT (OUTPATIENT)
Dept: OPHTHALMOLOGY | Facility: CLINIC | Age: 20
End: 2025-12-12
Payer: COMMERCIAL

## 2025-12-26 ENCOUNTER — APPOINTMENT (OUTPATIENT)
Dept: OPHTHALMOLOGY | Facility: CLINIC | Age: 20
End: 2025-12-26
Payer: COMMERCIAL

## (undated) DEVICE — Device

## (undated) DEVICE — PAD, SANITARY, OBSTETRICAL, W/ADHSV STRIP,11 IN,LF

## (undated) DEVICE — GLOVE, SURGICAL, PROTEXIS PI , 6.5, PF, LF

## (undated) DEVICE — BRIEF, CURITY, XLARGE, MESH

## (undated) DEVICE — GLOVE, SURGICAL, PROTEXIS PI BLUE W/NEUTHERA, 7.0, PF, LF

## (undated) DEVICE — TOWEL, SURGICAL, NEURO, O/R, 16 X 26, BLUE, STERILE

## (undated) DEVICE — PREP TRAY, VAGINAL